# Patient Record
Sex: MALE | Race: WHITE | NOT HISPANIC OR LATINO | Employment: OTHER | URBAN - METROPOLITAN AREA
[De-identification: names, ages, dates, MRNs, and addresses within clinical notes are randomized per-mention and may not be internally consistent; named-entity substitution may affect disease eponyms.]

---

## 2023-05-12 ENCOUNTER — OFFICE VISIT (OUTPATIENT)
Dept: PRIMARY CARE CLINIC | Facility: CLINIC | Age: 68
End: 2023-05-12
Payer: MEDICARE

## 2023-05-12 ENCOUNTER — HOSPITAL ENCOUNTER (OUTPATIENT)
Dept: RADIOLOGY | Facility: OTHER | Age: 68
Discharge: HOME | End: 2023-05-12
Attending: FAMILY MEDICINE
Payer: MEDICARE

## 2023-05-12 VITALS
OXYGEN SATURATION: 95 % | SYSTOLIC BLOOD PRESSURE: 137 MMHG | TEMPERATURE: 98 F | WEIGHT: 189.94 LBS | HEIGHT: 76 IN | DIASTOLIC BLOOD PRESSURE: 84 MMHG | HEART RATE: 90 BPM | RESPIRATION RATE: 18 BRPM | BODY MASS INDEX: 23.13 KG/M2

## 2023-05-12 DIAGNOSIS — J20.9 ACUTE BRONCHITIS, UNSPECIFIED ORGANISM: ICD-10-CM

## 2023-05-12 DIAGNOSIS — J20.9 ACUTE BRONCHITIS, UNSPECIFIED ORGANISM: Primary | ICD-10-CM

## 2023-05-12 PROCEDURE — 1159F PR MEDICATION LIST DOCUMENTED IN MEDICAL RECORD: ICD-10-PCS | Mod: CPTII,S$GLB,, | Performed by: FAMILY MEDICINE

## 2023-05-12 PROCEDURE — 3075F PR MOST RECENT SYSTOLIC BLOOD PRESS GE 130-139MM HG: ICD-10-PCS | Mod: CPTII,S$GLB,, | Performed by: FAMILY MEDICINE

## 2023-05-12 PROCEDURE — 99999 PR PBB SHADOW E&M-NEW PATIENT-LVL V: ICD-10-PCS | Mod: PBBFAC,,, | Performed by: FAMILY MEDICINE

## 2023-05-12 PROCEDURE — 99999 PR PBB SHADOW E&M-NEW PATIENT-LVL V: CPT | Mod: PBBFAC,,, | Performed by: FAMILY MEDICINE

## 2023-05-12 PROCEDURE — 3008F BODY MASS INDEX DOCD: CPT | Mod: CPTII,S$GLB,, | Performed by: FAMILY MEDICINE

## 2023-05-12 PROCEDURE — 71046 X-RAY EXAM CHEST 2 VIEWS: CPT | Mod: 26,,, | Performed by: RADIOLOGY

## 2023-05-12 PROCEDURE — 1159F MED LIST DOCD IN RCRD: CPT | Mod: CPTII,S$GLB,, | Performed by: FAMILY MEDICINE

## 2023-05-12 PROCEDURE — 71046 XR CHEST PA AND LATERAL: ICD-10-PCS | Mod: 26,,, | Performed by: RADIOLOGY

## 2023-05-12 PROCEDURE — 1126F PR PAIN SEVERITY QUANTIFIED, NO PAIN PRESENT: ICD-10-PCS | Mod: CPTII,S$GLB,, | Performed by: FAMILY MEDICINE

## 2023-05-12 PROCEDURE — 1126F AMNT PAIN NOTED NONE PRSNT: CPT | Mod: CPTII,S$GLB,, | Performed by: FAMILY MEDICINE

## 2023-05-12 PROCEDURE — 3075F SYST BP GE 130 - 139MM HG: CPT | Mod: CPTII,S$GLB,, | Performed by: FAMILY MEDICINE

## 2023-05-12 PROCEDURE — 1160F PR REVIEW ALL MEDS BY PRESCRIBER/CLIN PHARMACIST DOCUMENTED: ICD-10-PCS | Mod: CPTII,S$GLB,, | Performed by: FAMILY MEDICINE

## 2023-05-12 PROCEDURE — 99203 PR OFFICE/OUTPT VISIT, NEW, LEVL III, 30-44 MIN: ICD-10-PCS | Mod: S$GLB,,, | Performed by: FAMILY MEDICINE

## 2023-05-12 PROCEDURE — 3008F PR BODY MASS INDEX (BMI) DOCUMENTED: ICD-10-PCS | Mod: CPTII,S$GLB,, | Performed by: FAMILY MEDICINE

## 2023-05-12 PROCEDURE — 1160F RVW MEDS BY RX/DR IN RCRD: CPT | Mod: CPTII,S$GLB,, | Performed by: FAMILY MEDICINE

## 2023-05-12 PROCEDURE — 3079F PR MOST RECENT DIASTOLIC BLOOD PRESSURE 80-89 MM HG: ICD-10-PCS | Mod: CPTII,S$GLB,, | Performed by: FAMILY MEDICINE

## 2023-05-12 PROCEDURE — 99203 OFFICE O/P NEW LOW 30 MIN: CPT | Mod: S$GLB,,, | Performed by: FAMILY MEDICINE

## 2023-05-12 PROCEDURE — 3079F DIAST BP 80-89 MM HG: CPT | Mod: CPTII,S$GLB,, | Performed by: FAMILY MEDICINE

## 2023-05-12 PROCEDURE — 71046 X-RAY EXAM CHEST 2 VIEWS: CPT | Mod: TC,FY

## 2023-05-12 RX ORDER — AMOXICILLIN 500 MG/1
500 CAPSULE ORAL 2 TIMES DAILY
COMMUNITY
Start: 2023-05-09

## 2023-05-12 RX ORDER — ATORVASTATIN CALCIUM 80 MG/1
TABLET, FILM COATED ORAL
COMMUNITY
Start: 2023-02-21

## 2023-05-12 RX ORDER — DILTIAZEM HYDROCHLORIDE 240 MG/1
CAPSULE, EXTENDED RELEASE ORAL
COMMUNITY
Start: 2023-02-21

## 2023-05-12 RX ORDER — PROMETHAZINE HYDROCHLORIDE AND DEXTROMETHORPHAN HYDROBROMIDE 6.25; 15 MG/5ML; MG/5ML
5 SYRUP ORAL EVERY 4 HOURS PRN
Qty: 118 ML | Refills: 1 | Status: SHIPPED | OUTPATIENT
Start: 2023-05-12 | End: 2023-05-22

## 2023-05-12 RX ORDER — LEVOCETIRIZINE DIHYDROCHLORIDE 5 MG/1
5 TABLET, FILM COATED ORAL NIGHTLY
Qty: 30 TABLET | Refills: 0 | Status: SHIPPED | OUTPATIENT
Start: 2023-05-12 | End: 2023-06-11

## 2023-05-12 RX ORDER — ALBUTEROL SULFATE 90 UG/1
2 AEROSOL, METERED RESPIRATORY (INHALATION) EVERY 4 HOURS PRN
Qty: 8 G | Refills: 0 | Status: SHIPPED | OUTPATIENT
Start: 2023-05-12

## 2023-05-12 RX ORDER — NITROGLYCERIN 0.4 MG/1
TABLET SUBLINGUAL
COMMUNITY
Start: 2023-02-20

## 2023-05-12 NOTE — PATIENT INSTRUCTIONS
Explained that after respiratory infection is better, may continue to have a dry cough for 2-6 weeks, and sometimes longer.  Cough lasting more than 3 weeks may need further evaluation.  To help ease a cough at home, can try:  -Drinking water, warm broths, and teas to stay hydrated  -Drinking lemon and honey (not suitable before the age of 12 months)  -Avoiding irritants such as pollen, smoke, and dust  -Using a humidifier (if you have one)  -Breathing in steam from a hot shower or bath to open the airways

## 2023-05-12 NOTE — PROGRESS NOTES
"  Patient Name: Jesus Cotter    : 1955  MRN: [id number]      Subjective:     Patient ID: Jesus is a 67 y.o. male    Chief Complaint:  Cough and Weight Loss    Cough  This is a recurrent problem. The current episode started more than 1 month ago. The problem has been unchanged. The cough is Productive of sputum. Associated symptoms include nasal congestion, rhinorrhea, a sore throat, shortness of breath and wheezing. Pertinent negatives include no chest pain, chills, ear congestion, ear pain, fever, headaches, hemoptysis or rash.      Review of Systems   Constitutional:  Positive for activity change, appetite change and fatigue. Negative for chills, diaphoresis and fever.   HENT:  Positive for rhinorrhea and sore throat. Negative for ear pain.    Respiratory:  Positive for cough, shortness of breath and wheezing. Negative for hemoptysis.    Cardiovascular:  Negative for chest pain.   Integumentary:  Negative for rash.   Neurological:  Negative for headaches.      Objective:   /84   Pulse 90   Temp 98.1 °F (36.7 °C) (Oral)   Resp 18   Ht 6' 4" (1.93 m)   Wt 86.1 kg (189 lb 14.8 oz)   SpO2 95%   BMI 23.12 kg/m²     Physical Exam  Vitals reviewed.   Constitutional:       General: He is not in acute distress.     Appearance: He is ill-appearing. He is not toxic-appearing.   HENT:      Right Ear: Tympanic membrane, ear canal and external ear normal.      Left Ear: Tympanic membrane, ear canal and external ear normal.      Nose: Rhinorrhea present.      Mouth/Throat:      Pharynx: Posterior oropharyngeal erythema present. No oropharyngeal exudate.      Tonsils: No tonsillar exudate.   Neck:      Thyroid: No thyromegaly.      Trachea: Trachea normal.   Cardiovascular:      Rate and Rhythm: Normal rate and regular rhythm.      Pulses:           Radial pulses are 2+ on the right side and 2+ on the left side.      Heart sounds: S1 normal and S2 normal.   Pulmonary:      Effort: No tachypnea, accessory " muscle usage or respiratory distress.      Breath sounds: Wheezing (scattered) and rhonchi (scattered) present. No rales.   Abdominal:      General: Bowel sounds are normal.      Palpations: Abdomen is soft.      Tenderness: There is no abdominal tenderness.   Musculoskeletal:      Cervical back: Normal range of motion and neck supple.   Lymphadenopathy:      Cervical: No cervical adenopathy.   Skin:     Capillary Refill: Capillary refill takes less than 2 seconds.   Neurological:      Mental Status: He is alert.      Assessment        ICD-10-CM ICD-9-CM   1. Acute bronchitis, unspecified organism  J20.9 466.0         Plan:     1. Acute bronchitis, unspecified organism  -     albuterol (PROVENTIL/VENTOLIN HFA) 90 mcg/actuation inhaler; Inhale 2 puffs into the lungs every 4 (four) hours as needed for Wheezing or Shortness of Breath. Rescue  Dispense: 8 g; Refill: 0  -     X-Ray Chest PA And Lateral; Future; Expected date: 05/12/2023  -     promethazine-dextromethorphan (PROMETHAZINE-DM) 6.25-15 mg/5 mL Syrp; Take 5 mLs by mouth every 4 (four) hours as needed (cough/congestion).  Dispense: 118 mL; Refill: 1  -     levocetirizine (XYZAL) 5 MG tablet; Take 1 tablet (5 mg total) by mouth every evening.  Dispense: 30 tablet; Refill: 0    Advised symptomatic care with plenty of fluids, honey and lemon, rest, and prescribed regimen.  OTC Ibuprofen or Tylenol for pain.  Discussed course of a viral respiratory infections.  Explained that after respiratory infection is better, may continue to have a dry cough for 2-4 weeks, and sometimes longer.  Cough lasting more than 3 weeks may need further evaluation.  To help ease a cough at home, can try:  -Drinking water, warm broths, and teas to stay hydrated  -Drinking lemon and honey (not suitable before the age of 12 months)  -Avoiding irritants such as pollen, smoke, and dust  -Using a humidifier  -Breathing in steam from a hot shower or bath to open the airways         -Khanh NEVAREZ  Jr. Layton MD, [facility]      This office note has been dictated.  This dictation has been generated using M-Modal Fluency Direct dictation; some phonetic errors may occur.         Patient Instructions   Explained that after respiratory infection is better, may continue to have a dry cough for 2-6 weeks, and sometimes longer.  Cough lasting more than 3 weeks may need further evaluation.  To help ease a cough at home, can try:  -Drinking water, warm broths, and teas to stay hydrated  -Drinking lemon and honey (not suitable before the age of 12 months)  -Avoiding irritants such as pollen, smoke, and dust  -Using a humidifier (if you have one)  -Breathing in steam from a hot shower or bath to open the airways        No future appointments.

## 2023-05-15 ENCOUNTER — TELEPHONE (OUTPATIENT)
Dept: FAMILY MEDICINE | Facility: CLINIC | Age: 68
End: 2023-05-15
Payer: MEDICARE

## 2023-05-15 NOTE — TELEPHONE ENCOUNTER
Returned patient's phone call and relayed his normal chest x-ray and to continue with current regimen. He verbalized understanding.

## 2023-05-15 NOTE — TELEPHONE ENCOUNTER
----- Message from Calista Gilliam sent at 5/15/2023  8:49 AM CDT -----  Name of Who is Calling: WGANER LORENZANA [[id number]]           What is the request in detail: PT stated that he would like to speak directly with the office in regards to his questions/concerns about test results.Please contact to further discuss and advise.            Can the clinic reply by [facility]: [phone]           What Number to Call Back if not in [facility]: [phone]

## 2023-05-16 ENCOUNTER — TELEPHONE (OUTPATIENT)
Dept: FAMILY MEDICINE | Facility: CLINIC | Age: 68
End: 2023-05-16
Payer: MEDICARE

## 2023-05-16 NOTE — TELEPHONE ENCOUNTER
----- Message from Khanh Layton Jr., MD sent at 5/14/2023 12:11 PM CDT -----  No pneumonia noted on CXR

## 2023-05-16 NOTE — TELEPHONE ENCOUNTER
Spoke with patient in regards to lab/test results, verbalized understanding. Instructed to contact office with any questions or concerns.

## 2023-08-04 ENCOUNTER — TELEPHONE (OUTPATIENT)
Dept: FAMILY MEDICINE | Facility: CLINIC | Age: 68
End: 2023-08-04
Payer: MEDICARE

## 2023-08-04 NOTE — TELEPHONE ENCOUNTER
----- Message from Marck Stewart sent at 8/4/2023 11:06 AM CDT -----  Regarding: Urgent Call Back Request  Name of Who is Calling: WAGNER LORENZANA [[id number]]           What is the request in detail: Patient is requesting a call back to speak with Dr. Layton.  Patient has a swollen elbow and wishes to be seen today.  Patient would like to do an audio visit today.  Please assist.           Can the clinic reply by [facility]: No           What Number to Call Back if not in [facility]: [phone]